# Patient Record
Sex: FEMALE | Race: WHITE | URBAN - METROPOLITAN AREA
[De-identification: names, ages, dates, MRNs, and addresses within clinical notes are randomized per-mention and may not be internally consistent; named-entity substitution may affect disease eponyms.]

---

## 2021-11-15 ENCOUNTER — OFFICE VISIT (OUTPATIENT)
Dept: URBAN - METROPOLITAN AREA CLINIC 27 | Facility: CLINIC | Age: 86
End: 2021-11-15
Payer: MEDICARE

## 2021-11-15 DIAGNOSIS — H40.9 GLAUCOMA: ICD-10-CM

## 2021-11-15 DIAGNOSIS — H33.8 OTHER RETINAL DETACHMENTS: ICD-10-CM

## 2021-11-15 DIAGNOSIS — Z96.1 PRESENCE OF INTRAOCULAR LENS: ICD-10-CM

## 2021-11-15 PROCEDURE — 92242 FLUORESCEIN&ICG ANGIOGRAPHY: CPT | Performed by: OPHTHALMOLOGY

## 2021-11-15 PROCEDURE — 99203 OFFICE O/P NEW LOW 30 MIN: CPT | Performed by: OPHTHALMOLOGY

## 2021-11-15 PROCEDURE — 92134 CPTRZ OPH DX IMG PST SGM RTA: CPT | Performed by: OPHTHALMOLOGY

## 2021-11-15 PROCEDURE — 92250 FUNDUS PHOTOGRAPHY W/I&R: CPT | Performed by: OPHTHALMOLOGY

## 2021-11-15 ASSESSMENT — INTRAOCULAR PRESSURE
OS: 15
OD: 14

## 2021-11-15 NOTE — IMPRESSION/PLAN
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  FA 11/15/21 shows staining without leakage. ICG shows window defect. Cont AREDS/AG; RTC immediately prn dec VA, inc Sxs.

## 2021-11-15 NOTE — IMPRESSION/PLAN
Impression: wet AMD OS Plan: Exam/photos/OCT show a CNVM with heme/IRF c/w wet AMD.  FA 11/15/21 shows staining, blockage, and classic leakage. ICG shows staining. The Dx, NHx, and Px of wet AMD were discussed at length. Reviewed options of observation, anti-VEGF, and laser; recommend anti-VEGF series. The patient understands that treatment may not improve vision, but should reduce the risk of further visual loss. 

1 week, Lucentis OS 1/3

## 2021-11-22 ENCOUNTER — PROCEDURE (OUTPATIENT)
Dept: URBAN - METROPOLITAN AREA CLINIC 27 | Facility: CLINIC | Age: 86
End: 2021-11-22
Payer: MEDICARE

## 2021-11-22 PROCEDURE — 67028 INJECTION EYE DRUG: CPT | Performed by: OPHTHALMOLOGY

## 2021-11-22 ASSESSMENT — INTRAOCULAR PRESSURE
OS: 15
OD: 13

## 2021-12-28 ENCOUNTER — OFFICE VISIT (OUTPATIENT)
Dept: URBAN - METROPOLITAN AREA CLINIC 41 | Facility: CLINIC | Age: 86
End: 2021-12-28
Payer: MEDICARE

## 2021-12-28 PROCEDURE — 67028 INJECTION EYE DRUG: CPT | Performed by: OPHTHALMOLOGY

## 2021-12-28 ASSESSMENT — INTRAOCULAR PRESSURE
OS: 22
OD: 21

## 2022-01-25 ENCOUNTER — OFFICE VISIT (OUTPATIENT)
Dept: URBAN - METROPOLITAN AREA CLINIC 41 | Facility: CLINIC | Age: 87
End: 2022-01-25
Payer: MEDICARE

## 2022-01-25 PROCEDURE — 67028 INJECTION EYE DRUG: CPT | Performed by: OPHTHALMOLOGY

## 2022-01-25 ASSESSMENT — INTRAOCULAR PRESSURE
OD: 15
OS: 15

## 2022-02-22 ENCOUNTER — OFFICE VISIT (OUTPATIENT)
Dept: URBAN - METROPOLITAN AREA CLINIC 41 | Facility: CLINIC | Age: 87
End: 2022-02-22
Payer: MEDICARE

## 2022-02-22 DIAGNOSIS — H35.3221 EXUDATIVE AGE-RELATED MACULAR DEGENERATION, LEFT EYE, WITH ACTIVE CHOROIDAL NEOVASCULARIZATION: Primary | ICD-10-CM

## 2022-02-22 DIAGNOSIS — H35.3113 NONEXUDATIVE AGE-RELATED MACULAR DEGENERATION, RIGHT EYE, ADVANCED ATROPHIC WITHOUT SUBFOVEAL INVOLVEMENT: ICD-10-CM

## 2022-02-22 PROCEDURE — 99213 OFFICE O/P EST LOW 20 MIN: CPT | Performed by: OPHTHALMOLOGY

## 2022-02-22 PROCEDURE — 92134 CPTRZ OPH DX IMG PST SGM RTA: CPT | Performed by: OPHTHALMOLOGY

## 2022-02-22 PROCEDURE — 67028 INJECTION EYE DRUG: CPT | Performed by: OPHTHALMOLOGY

## 2022-02-22 ASSESSMENT — INTRAOCULAR PRESSURE
OS: 10
OD: 11

## 2022-02-22 NOTE — IMPRESSION/PLAN
Impression: wet AMD OS Plan: Exam/OCT show a CNVM with improving, but persistent heme OS. FA 11/15/21 showed staining, blockage, and classic leakage. ICG showed staining. Reviewed options of observation, anti-VEGF, and laser; recommend 2nd Lucentis series.  

4 weeks, Lucentis OS 2/3

## 2022-02-22 NOTE — IMPRESSION/PLAN
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  FA 11/15/21 showed staining without leakage. ICG showed window defect. Cont AREDS/AG; RTC immediately prn dec VA, inc Sxs.

## 2022-03-22 ENCOUNTER — PROCEDURE (OUTPATIENT)
Dept: URBAN - METROPOLITAN AREA CLINIC 41 | Facility: CLINIC | Age: 87
End: 2022-03-22
Payer: MEDICARE

## 2022-03-22 PROCEDURE — 67028 INJECTION EYE DRUG: CPT | Performed by: OPHTHALMOLOGY

## 2022-03-22 ASSESSMENT — INTRAOCULAR PRESSURE
OD: 20
OS: 20

## 2022-05-03 ENCOUNTER — PROCEDURE (OUTPATIENT)
Dept: URBAN - METROPOLITAN AREA CLINIC 41 | Facility: CLINIC | Age: 87
End: 2022-05-03
Payer: MEDICARE

## 2022-05-03 DIAGNOSIS — H35.3221 EXUDATIVE AGE-RELATED MACULAR DEGENERATION, LEFT EYE, WITH ACTIVE CHOROIDAL NEOVASCULARIZATION: Primary | ICD-10-CM

## 2022-05-03 PROCEDURE — 67028 INJECTION EYE DRUG: CPT | Performed by: OPHTHALMOLOGY

## 2022-05-03 ASSESSMENT — INTRAOCULAR PRESSURE
OD: 12
OS: 13

## 2022-05-31 ENCOUNTER — OFFICE VISIT (OUTPATIENT)
Dept: URBAN - METROPOLITAN AREA CLINIC 41 | Facility: CLINIC | Age: 87
End: 2022-05-31
Payer: MEDICARE

## 2022-05-31 DIAGNOSIS — Z96.1 PRESENCE OF INTRAOCULAR LENS: ICD-10-CM

## 2022-05-31 DIAGNOSIS — H40.9 GLAUCOMA: ICD-10-CM

## 2022-05-31 DIAGNOSIS — H35.3113 NONEXUDATIVE AGE-RELATED MACULAR DEGENERATION, RIGHT EYE, ADVANCED ATROPHIC WITHOUT SUBFOVEAL INVOLVEMENT: ICD-10-CM

## 2022-05-31 DIAGNOSIS — H33.8 OTHER RETINAL DETACHMENTS: ICD-10-CM

## 2022-05-31 DIAGNOSIS — H35.3221 EXUDATIVE AGE-RELATED MACULAR DEGENERATION, LEFT EYE, WITH ACTIVE CHOROIDAL NEOVASCULARIZATION: Primary | ICD-10-CM

## 2022-05-31 PROCEDURE — 92014 COMPRE OPH EXAM EST PT 1/>: CPT | Performed by: OPHTHALMOLOGY

## 2022-05-31 PROCEDURE — 92134 CPTRZ OPH DX IMG PST SGM RTA: CPT | Performed by: OPHTHALMOLOGY

## 2022-05-31 ASSESSMENT — INTRAOCULAR PRESSURE
OS: 12
OD: 13

## 2022-05-31 NOTE — IMPRESSION/PLAN
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 FA 11/15/21 showed staining without leakage. ICG showed window defect. Cont AREDS/AG; RTC immediately prn dec VA, inc Sxs.

## 2022-05-31 NOTE — IMPRESSION/PLAN
Impression: wet AMD OS Plan: Exam/OCT show a CNVM with improving, but persistent heme OS. FA 11/15/21 showed staining, blockage, and classic leakage. ICG showed staining. Reviewed options of observation, anti-VEGF, and laser; recommend Lucentis series until heme is resolved. Family declines further Tx, as they feel it's too anxiety-inducing and strenuous for the patient. They understand and accept the risk of irreversible VA loss without proper treatment. 

family will schedule as needed